# Patient Record
Sex: FEMALE | Employment: UNEMPLOYED | URBAN - METROPOLITAN AREA
[De-identification: names, ages, dates, MRNs, and addresses within clinical notes are randomized per-mention and may not be internally consistent; named-entity substitution may affect disease eponyms.]

---

## 2017-09-12 ENCOUNTER — OFFICE VISIT (OUTPATIENT)
Dept: PHYSICAL THERAPY | Age: 37
End: 2017-09-12
Attending: PHYSICAL MEDICINE & REHABILITATION
Payer: COMMERCIAL

## 2017-09-12 DIAGNOSIS — M79.10 MUSCLE PAIN: ICD-10-CM

## 2017-09-12 PROCEDURE — 97161 PT EVAL LOW COMPLEX 20 MIN: CPT | Performed by: PHYSICAL THERAPIST

## 2017-09-12 NOTE — PROGRESS NOTES
SPINE EVALUATION:   Referring Physician: Dr. Lizette Quintana  Diagnosis: Post-operative muscle pain  H/O Carolyn-Danlos Syndrome     Date of Service: 9/12/2017     PATIENT Marlee Osler is a 40year old y/o female who presents to therapy t listed above. Noralyn Remedies would benefit from skilled Physical Therapy to address the above impairments to increase strength and decrease pain.      Precautions:  None  OBJECTIVE:   Observation/Posture: forward head, rounded shoulders, abdominal atrophy  Gait: centralization and absence of radicular symptoms for 3 consecutive days to improve function with ADL (8 visits)  · Pt will have scapular strength of at least 4+/5 to improve posture with functional lifting tasks.  (8 visits)  · Pt will demonstrate improved

## 2017-09-19 ENCOUNTER — OFFICE VISIT (OUTPATIENT)
Dept: PHYSICAL THERAPY | Age: 37
End: 2017-09-19
Attending: PHYSICAL MEDICINE & REHABILITATION
Payer: COMMERCIAL

## 2017-09-19 PROCEDURE — 97112 NEUROMUSCULAR REEDUCATION: CPT | Performed by: PHYSICAL THERAPIST

## 2017-09-19 PROCEDURE — 97110 THERAPEUTIC EXERCISES: CPT | Performed by: PHYSICAL THERAPIST

## 2017-09-19 NOTE — PROGRESS NOTES
Dx:  Post-operative muscle pain  H/O Carolyn-Danlos Syndrome            Subjective: Mercy Alba returns stating she received her exercises via email. She is eager to begin her strengthening protocol at this time.      Objective:   HEP review: needs visual, tactile, 5 sec hold         RTB mid height shldr row x 15, standing extension x 15, high row x 15,          RTB ER, IR, scaption, and abduction x 15         Doorway pec stretch             Charges: Dilcia 2( 30 min) NR x 1  ( 15 min)   Total Timed Treatment: 45 min

## 2017-09-26 ENCOUNTER — OFFICE VISIT (OUTPATIENT)
Dept: PHYSICAL THERAPY | Age: 37
End: 2017-09-26
Attending: PHYSICAL MEDICINE & REHABILITATION
Payer: COMMERCIAL

## 2017-09-26 PROCEDURE — 97110 THERAPEUTIC EXERCISES: CPT | Performed by: PHYSICAL THERAPIST

## 2017-09-26 NOTE — PROGRESS NOTES
Dx:  Post-operative muscle pain  H/O Carolyn-Danlos Syndrome            Subjective: Nora Manuel states she has some mild shoulder and scapular pain. She has returned to performing her exercises. She has some difficulty with planks.  She feels she is progressing wel Plank 3x30 sec         Alt Quad bird dog x 60 sec 5 sec hold Alt Quad bird dog x 60 sec 5 sec hold        - Body weight squat 3x10        RTB mid height shldr row x 15, standing extension x 15, high row x 15,  Banded dead lift 3x10        RTB ER, IR, scapt

## 2017-10-03 ENCOUNTER — OFFICE VISIT (OUTPATIENT)
Dept: PHYSICAL THERAPY | Age: 37
End: 2017-10-03
Attending: PHYSICAL MEDICINE & REHABILITATION
Payer: COMMERCIAL

## 2017-10-03 PROCEDURE — 97112 NEUROMUSCULAR REEDUCATION: CPT | Performed by: PHYSICAL THERAPIST

## 2017-10-03 PROCEDURE — 97110 THERAPEUTIC EXERCISES: CPT | Performed by: PHYSICAL THERAPIST

## 2017-10-03 NOTE — PROGRESS NOTES
Dx:  Post-operative muscle pain  H/O Carolyn-Danlos Syndrome            Subjective: Moses Gamez states she has some mild shoulder and scapular pain. She has returned to performing her exercises. She has some difficulty with planks.  She feels she is progressing wel band walks 20 feet x 2 laps RTB Side band walks 20 feet x 2 laps RTB Side band walks 20 feet x 2 laps RTB       Monster walks x 2  Monster walks x 2 X 2 laps RTB       Bridge with clamshell x 20 RTB Bridge with clamshell x 20 RTB x20       Plank 3x30 sec

## 2017-10-10 ENCOUNTER — OFFICE VISIT (OUTPATIENT)
Dept: PHYSICAL THERAPY | Age: 37
End: 2017-10-10
Attending: PHYSICAL MEDICINE & REHABILITATION
Payer: COMMERCIAL

## 2017-10-10 PROCEDURE — 97110 THERAPEUTIC EXERCISES: CPT | Performed by: PHYSICAL THERAPIST

## 2017-10-10 NOTE — PROGRESS NOTES
Dx:  Post-operative muscle pain  H/O Carolyn-Danlos Syndrome            Subjective: Asia Quiros reports she is feeling stronger through her legs. She notes that she is performing HEP as instructed. Objective:   See chart.        Assessment: Klaudia luis progr walks 20 feet x 2 laps RTB Side band walks 20 feet x 2 laps RTB Side band walks 20 feet x 2 laps RTB Blue TB x 2 laps       Monster walks x 2  Monster walks x 2 X 2 laps RTB -      Bridge with clamshell x 20 RTB Bridge with clamshell x 20 RTB x20 Supine cr

## 2017-10-17 ENCOUNTER — APPOINTMENT (OUTPATIENT)
Dept: PHYSICAL THERAPY | Age: 37
End: 2017-10-17
Attending: PHYSICAL MEDICINE & REHABILITATION
Payer: COMMERCIAL

## 2017-10-17 ENCOUNTER — TELEPHONE (OUTPATIENT)
Dept: PHYSICAL THERAPY | Age: 37
End: 2017-10-17

## 2017-10-24 ENCOUNTER — OFFICE VISIT (OUTPATIENT)
Dept: PHYSICAL THERAPY | Age: 37
End: 2017-10-24
Attending: PHYSICAL MEDICINE & REHABILITATION
Payer: COMMERCIAL

## 2017-10-24 PROCEDURE — 97110 THERAPEUTIC EXERCISES: CPT | Performed by: PHYSICAL THERAPIST

## 2017-10-24 NOTE — PROGRESS NOTES
Progress  Summary    Pt has attended 6, cancelled 1, and no shown 0 visits in Physical Therapy.    Dx:  Post-operative muscle pain  H/O Carolyn-Danlos Syndrome       Subjective: Patient reports she continues to have difficulty with getting up from bed as we 5/5  Knee Flexion: R 5/5; L 5/5   Knee extension: R 5/5; L 5/5          Special tests:   ULNNT: + bilaterally for median Improved  Core strength (DL lowering): pain at 10 deg from 90 deg (remains)  Sit-up test: pain with initiation of sit up Improved    Go and for this course of care. Thank you for your referral. If you have any questions, please contact me at Dept: [phone].     Sincerely,  Electronically signed by therapist: Ho White PT        Date: 9/19/2017  Tx#: 2/ Date: 9/26  Tx#: 3/ Da Treatment Time: 30 min

## 2017-10-31 ENCOUNTER — APPOINTMENT (OUTPATIENT)
Dept: PHYSICAL THERAPY | Age: 37
End: 2017-10-31
Attending: PHYSICAL MEDICINE & REHABILITATION
Payer: COMMERCIAL

## 2017-11-14 ENCOUNTER — OFFICE VISIT (OUTPATIENT)
Dept: PHYSICAL THERAPY | Age: 37
End: 2017-11-14
Attending: PHYSICAL MEDICINE & REHABILITATION
Payer: COMMERCIAL

## 2017-11-14 PROCEDURE — 97140 MANUAL THERAPY 1/> REGIONS: CPT | Performed by: PHYSICAL THERAPIST

## 2017-11-14 PROCEDURE — 97110 THERAPEUTIC EXERCISES: CPT | Performed by: PHYSICAL THERAPIST

## 2017-11-14 NOTE — PROGRESS NOTES
Dx:  Post-operative muscle pain  H/O Carolyn-Danlos Syndrome       Subjective: Patient returns following 2 week hiatus due to child illness. She states she is feeling stronger through her core.  She continues to have some mild shoulder and neck discomfort 11/14  Tx#: 7/8 Date:    Tx#: 8/   HEP review UBE UE&LE x 5 min L5 Treadmill x 5 min Treadmill for x 2 min  Lateral x 2 min each Treadmill for x 2 min  Lateral x 2 min each Treadmill for x 2 min  Lateral x 2 min each    3 way hip kick x10 each RTB X 15 RTB stretch 2x30 sec         Charges: Dilcia 2, M x 1 Total Timed Treatment: 45 min     Total Treatment Time: 45 min

## 2017-12-12 ENCOUNTER — OFFICE VISIT (OUTPATIENT)
Dept: PHYSICAL THERAPY | Age: 37
End: 2017-12-12
Attending: PHYSICAL MEDICINE & REHABILITATION
Payer: COMMERCIAL

## 2017-12-12 PROCEDURE — 97140 MANUAL THERAPY 1/> REGIONS: CPT | Performed by: PHYSICAL THERAPIST

## 2017-12-12 PROCEDURE — 97110 THERAPEUTIC EXERCISES: CPT | Performed by: PHYSICAL THERAPIST

## 2017-12-12 NOTE — PROGRESS NOTES
Discharge Summary    Pt has attended 8, cancelled 0, and no shown 0 visits in Physical Therapy.    Dx:  Post-operative muscle pain  H/O Carolyn-Danlos Syndrome    Subjective: Patient reports she has not been as compliant with her exercises as instructed per pain. ( 8 visits ) - not met  · Pt will report improved symptom centralization and absence of radicular symptoms for 3 consecutive days to improve function with ADL (8 visits) - met  · Pt will have scapular strength of at least 4+/5 to improve posture with Supine crunch 2x10 UT stretch 3x30 sec  ITband mobilization x 4 min Psoas mobilization x 4 min   Plank 3x30 sec  Plank 3x30 sec  2x45 sec  Modified plank with SB pertubation 2x10 Plank 2x30 sec  3x15 sec with iso contract Plank 60 sec SA   Alt Quad bird do

## 2018-01-08 ENCOUNTER — APPOINTMENT (OUTPATIENT)
Dept: PHYSICAL THERAPY | Age: 38
End: 2018-01-08
Attending: PHYSICAL MEDICINE & REHABILITATION
Payer: COMMERCIAL

## 2018-01-15 ENCOUNTER — APPOINTMENT (OUTPATIENT)
Dept: PHYSICAL THERAPY | Age: 38
End: 2018-01-15
Attending: PHYSICAL MEDICINE & REHABILITATION
Payer: COMMERCIAL

## 2018-01-22 ENCOUNTER — APPOINTMENT (OUTPATIENT)
Dept: PHYSICAL THERAPY | Age: 38
End: 2018-01-22
Attending: PHYSICAL MEDICINE & REHABILITATION
Payer: COMMERCIAL

## 2018-01-29 ENCOUNTER — APPOINTMENT (OUTPATIENT)
Dept: PHYSICAL THERAPY | Age: 38
End: 2018-01-29
Attending: PHYSICAL MEDICINE & REHABILITATION
Payer: COMMERCIAL

## 2018-11-28 ENCOUNTER — IMAGING SERVICES (OUTPATIENT)
Dept: OTHER | Age: 38
End: 2018-11-28

## 2018-11-28 ENCOUNTER — HOSPITAL (OUTPATIENT)
Dept: OTHER | Age: 38
End: 2018-11-28
Attending: INTERNAL MEDICINE

## 2019-09-10 ENCOUNTER — HOSPITAL (OUTPATIENT)
Dept: OTHER | Age: 39
End: 2019-09-10
Attending: EMERGENCY MEDICINE

## 2019-09-10 ENCOUNTER — APPOINTMENT (OUTPATIENT)
Dept: URGENT CARE | Age: 39
End: 2019-09-10

## 2022-01-01 ENCOUNTER — EXTERNAL RECORD (OUTPATIENT)
Dept: OTHER | Age: 42
End: 2022-01-01

## 2022-04-11 LAB
25(OH)D3+25(OH)D2 SERPL-MCNC: 36 NG/ML (ref 30–100)
ALBUMIN SERPL-MCNC: 4.3 G/DL (ref 3.6–5.1)
ALBUMIN/GLOB SERPL: 1.8 (CALC) (ref 1–2.5)
ALP SERPL-CCNC: 72 U/L (ref 31–125)
ALT SERPL-CCNC: 25 U/L (ref 6–29)
AST SERPL-CCNC: 23 U/L (ref 10–30)
BASOPHILS # BLD: 68 CELLS/UL (ref 0–200)
BASOPHILS NFR BLD: 0.8 %
BILIRUB SERPL-MCNC: 0.4 MG/DL (ref 0.2–1.2)
BUN SERPL-MCNC: 9 MG/DL (ref 7–25)
BUN/CREAT SERPL: NORMAL (CALC) (ref 6–22)
C TRACH RRNA SPEC QL NAA+PROBE: NOT DETECTED
C TRACH RRNA SPEC QL NAA+PROBE: NOT DETECTED
CALCIUM SERPL-MCNC: 9.7 MG/DL (ref 8.6–10.2)
CALCIUM, CORRECTED: 9.8 MG/DL (CALC) (ref 8.6–10.2)
CHLORIDE SERPL-SCNC: 104 MMOL/L (ref 98–110)
CHOLEST SERPL-MCNC: 224 MG/DL
CHOLEST/HDLC SERPL: 3.9 (CALC)
CO2 SERPL-SCNC: 27 MMOL/L (ref 20–32)
CREAT SERPL-MCNC: 0.81 MG/DL (ref 0.5–1.1)
CRP SERPL-MCNC: 1.6 MG/L
EOSINOPHIL # BLD: 119 CELLS/UL (ref 15–500)
EOSINOPHIL NFR BLD: 1.4 %
ERYTHROCYTE [DISTWIDTH] IN BLOOD: 14.8 % (ref 11–15)
ERYTHROCYTE [SEDIMENTATION RATE] IN BLOOD BY WESTERGREN METHOD: 2 MM/H
GLOBULIN SER-MCNC: 2.4 G/DL (CALC) (ref 1.9–3.7)
GLUCOSE SERPL-MCNC: 96 MG/DL (ref 65–99)
HCT VFR BLD CALC: 41.3 % (ref 35–45)
HDLC SERPL-MCNC: 57 MG/DL
HGB BLD-MCNC: 13.3 G/DL (ref 11.7–15.5)
HIV 1+2 AB+HIV1 P24 AG SERPL QL IA: NORMAL
LDLC SERPL CALC-MCNC: 135 MG/DL (CALC)
LENGTH OF FAST TIME PATIENT: ABNORMAL H
LENGTH OF FAST TIME PATIENT: NORMAL H
LYMPHOCYTES # BLD: 2023 CELLS/UL (ref 850–3900)
LYMPHOCYTES NFR BLD: 23.8 %
MCH RBC QN AUTO: 27.7 PG (ref 27–33)
MCHC RBC AUTO-ENTMCNC: 32.2 G/DL (ref 32–36)
MCV RBC AUTO: 86 FL (ref 80–100)
MONOCYTES # BLD: 561 CELLS/UL (ref 200–950)
MONOCYTES NFR BLD: 6.6 %
MPV (OFFPRE2): 10.8 FL (ref 7.5–12.5)
N GONORRHOEA RRNA SPEC QL NAA+PROBE: NOT DETECTED
N GONORRHOEA RRNA SPEC QL NAA+PROBE: NOT DETECTED
NEUTROPHILS # BLD: 5729 CELLS/UL (ref 1500–7800)
NEUTROPHILS NFR BLD: 67.4 %
NONHDLC SERPL-MCNC: 167 MG/DL (CALC)
PLATELET # BLD: 276 THOUSAND/UL (ref 140–400)
POTASSIUM SERPL-SCNC: 4.6 MMOL/L (ref 3.5–5.3)
PROT SERPL-MCNC: 6.7 G/DL (ref 6.1–8.1)
RBC # BLD: 4.8 MILLION/UL (ref 3.8–5.1)
SODIUM SERPL-SCNC: 137 MMOL/L (ref 135–146)
SPECIMEN SOURCE: NORMAL
SPECIMEN SOURCE: NORMAL
T3 SERPL-MCNC: 111 NG/DL (ref 76–181)
T4 SERPL-MCNC: 6.9 MCG/DL (ref 5.1–11.9)
TREPONEMA PALLIDUM IGG/IGM AB (SYPGM): NEGATIVE
TRIGL SERPL-MCNC: 184 MG/DL
TSH SERPL-ACNC: 0.67 MIU/L
WBC # BLD: 8.5 THOUSAND/UL (ref 3.8–10.8)

## 2022-05-07 LAB
APPEARANCE UR: CLEAR
BACTERIA #/AREA URNS HPF: ABNORMAL /HPF
BILIRUB UR QL: NEGATIVE
COLOR UR: YELLOW
GLUCOSE UR-MCNC: NEGATIVE MG/DL
HGB UR QL: ABNORMAL
HYALINE CASTS #/AREA URNS LPF: ABNORMAL /LPF
KETONES UR-MCNC: NEGATIVE MG/DL
LEUKOCYTE ESTERASE UR QL STRIP: ABNORMAL
NITRITE UR QL: NEGATIVE
PH UR: 5.5 [PH] (ref 5–8)
PROT UR QL: NEGATIVE
RBC #/AREA URNS HPF: ABNORMAL /HPF
SP GR UR: 1.02 (ref 1–1.03)
SQUAMOUS #/AREA URNS HPF: ABNORMAL /HPF
WBC #/AREA URNS HPF: ABNORMAL /HPF

## 2022-07-21 ENCOUNTER — EXTERNAL RECORD (OUTPATIENT)
Dept: HEALTH INFORMATION MANAGEMENT | Facility: OTHER | Age: 42
End: 2022-07-21

## 2022-07-22 ENCOUNTER — TELEPHONE (OUTPATIENT)
Dept: SCHEDULING | Age: 42
End: 2022-07-22

## 2022-07-28 ENCOUNTER — TELEPHONE (OUTPATIENT)
Dept: RHEUMATOLOGY | Age: 42
End: 2022-07-28

## 2022-08-01 ENCOUNTER — APPOINTMENT (OUTPATIENT)
Dept: RHEUMATOLOGY | Age: 42
End: 2022-08-01

## 2022-09-22 ENCOUNTER — OFFICE VISIT (OUTPATIENT)
Dept: RHEUMATOLOGY | Age: 42
End: 2022-09-22

## 2022-09-22 VITALS
BODY MASS INDEX: 32.81 KG/M2 | DIASTOLIC BLOOD PRESSURE: 75 MMHG | OXYGEN SATURATION: 98 % | HEIGHT: 64 IN | WEIGHT: 192.2 LBS | TEMPERATURE: 97.8 F | SYSTOLIC BLOOD PRESSURE: 119 MMHG | HEART RATE: 81 BPM

## 2022-09-22 DIAGNOSIS — L29.9 PRURITUS: Primary | ICD-10-CM

## 2022-09-22 DIAGNOSIS — Q79.60 EHLERS-DANLOS DISEASE: ICD-10-CM

## 2022-09-22 DIAGNOSIS — M54.2 NECK PAIN, MUSCULOSKELETAL: ICD-10-CM

## 2022-09-22 PROCEDURE — 99204 OFFICE O/P NEW MOD 45 MIN: CPT | Performed by: INTERNAL MEDICINE

## 2022-09-22 RX ORDER — IBUPROFEN 200 MG
TABLET ORAL
COMMUNITY

## 2022-09-22 RX ORDER — MELOXICAM 7.5 MG/1
TABLET ORAL
COMMUNITY
Start: 2022-08-29

## 2022-09-22 RX ORDER — EMTRICITABINE AND TENOFOVIR DISOPROXIL FUMARATE 200; 300 MG/1; MG/1
TABLET, FILM COATED ORAL
COMMUNITY
Start: 2022-06-01

## 2022-09-22 RX ORDER — LAMOTRIGINE 25 MG/1
TABLET ORAL
COMMUNITY
Start: 2022-07-20

## 2022-09-22 RX ORDER — ONDANSETRON 4 MG/1
TABLET, ORALLY DISINTEGRATING ORAL
COMMUNITY

## 2022-09-22 RX ORDER — DESVENLAFAXINE 25 MG/1
TABLET, EXTENDED RELEASE ORAL
COMMUNITY
Start: 2022-07-19

## 2022-09-22 RX ORDER — LORATADINE 10 MG/1
10 TABLET ORAL DAILY
COMMUNITY
Start: 2022-09-15

## 2022-09-22 RX ORDER — ALBUTEROL SULFATE 90 UG/1
AEROSOL, METERED RESPIRATORY (INHALATION)
COMMUNITY
Start: 2022-08-29

## 2022-09-22 RX ORDER — EPINEPHRINE 0.3 MG/.3ML
INJECTION SUBCUTANEOUS
COMMUNITY
Start: 2022-08-29

## 2022-09-22 RX ORDER — CYCLOBENZAPRINE HCL 5 MG
5 TABLET ORAL 2 TIMES DAILY PRN
Qty: 14 TABLET | Refills: 0 | Status: SHIPPED | OUTPATIENT
Start: 2022-09-22 | End: 2023-01-30 | Stop reason: SDUPTHER

## 2022-09-22 RX ORDER — METRONIDAZOLE 7.5 MG/G
GEL TOPICAL
COMMUNITY

## 2022-09-22 RX ORDER — NALTREXONE 100 %
1.5 POWDER (GRAM) MISCELLANEOUS
COMMUNITY

## 2022-09-22 ASSESSMENT — ENCOUNTER SYMPTOMS
AGITATION: 0
EYE PAIN: 0
FEVER: 0
EYE REDNESS: 0
ADENOPATHY: 0
SHORTNESS OF BREATH: 0
BLOOD IN STOOL: 0
NUMBNESS: 0

## 2022-10-25 ENCOUNTER — HOSPITAL ENCOUNTER (OUTPATIENT)
Dept: NEUROLOGY | Age: 42
Discharge: HOME | End: 2022-10-25
Attending: INTERNAL MEDICINE

## 2022-10-25 DIAGNOSIS — M54.2 NECK PAIN, MUSCULOSKELETAL: ICD-10-CM

## 2022-10-25 PROCEDURE — 95911 NRV CNDJ TEST 9-10 STUDIES: CPT

## 2022-10-25 PROCEDURE — 95911 NRV CNDJ TEST 9-10 STUDIES: CPT | Performed by: PSYCHIATRY & NEUROLOGY

## 2022-10-25 PROCEDURE — 95886 MUSC TEST DONE W/N TEST COMP: CPT

## 2022-10-25 PROCEDURE — 95886 MUSC TEST DONE W/N TEST COMP: CPT | Performed by: PSYCHIATRY & NEUROLOGY

## 2023-01-20 ENCOUNTER — APPOINTMENT (OUTPATIENT)
Dept: RHEUMATOLOGY | Age: 43
End: 2023-01-20

## 2023-01-30 ENCOUNTER — OFFICE VISIT (OUTPATIENT)
Dept: RHEUMATOLOGY | Age: 43
End: 2023-01-30

## 2023-01-30 VITALS
SYSTOLIC BLOOD PRESSURE: 121 MMHG | HEIGHT: 64 IN | TEMPERATURE: 96.2 F | HEART RATE: 75 BPM | WEIGHT: 186 LBS | BODY MASS INDEX: 31.76 KG/M2 | DIASTOLIC BLOOD PRESSURE: 77 MMHG | OXYGEN SATURATION: 100 %

## 2023-01-30 DIAGNOSIS — G56.03 CARPAL TUNNEL SYNDROME, BILATERAL: Primary | ICD-10-CM

## 2023-01-30 DIAGNOSIS — Q79.60 EHLERS-DANLOS DISEASE: ICD-10-CM

## 2023-01-30 DIAGNOSIS — M54.2 NECK PAIN, MUSCULOSKELETAL: ICD-10-CM

## 2023-01-30 DIAGNOSIS — M54.12 CERVICAL RADICULOPATHY: ICD-10-CM

## 2023-01-30 PROCEDURE — 99214 OFFICE O/P EST MOD 30 MIN: CPT | Performed by: INTERNAL MEDICINE

## 2023-01-30 RX ORDER — CYCLOBENZAPRINE HCL 5 MG
5 TABLET ORAL 2 TIMES DAILY PRN
Qty: 45 TABLET | Refills: 1 | Status: SHIPPED | OUTPATIENT
Start: 2023-01-30 | End: 2023-09-27

## 2023-01-30 ASSESSMENT — ENCOUNTER SYMPTOMS
SHORTNESS OF BREATH: 0
BLOOD IN STOOL: 0
FEVER: 0
EYE PAIN: 0
NUMBNESS: 0
EYE REDNESS: 0
AGITATION: 0
ADENOPATHY: 0

## 2023-02-28 ENCOUNTER — APPOINTMENT (OUTPATIENT)
Dept: REHABILITATION | Age: 43
End: 2023-02-28
Attending: INTERNAL MEDICINE

## 2023-03-10 ENCOUNTER — HOSPITAL ENCOUNTER (OUTPATIENT)
Dept: REHABILITATION | Age: 43
Discharge: STILL ADMITTED OR ADMINISTRATIVE | End: 2023-03-10
Attending: INTERNAL MEDICINE

## 2023-03-10 PROCEDURE — 97162 PT EVAL MOD COMPLEX 30 MIN: CPT

## 2023-03-10 PROCEDURE — 97530 THERAPEUTIC ACTIVITIES: CPT

## 2023-03-10 PROCEDURE — 97110 THERAPEUTIC EXERCISES: CPT

## 2023-03-16 ASSESSMENT — ENCOUNTER SYMPTOMS: ALLEVIATING FACTORS: AVOIDING MOVEMENT IN INVOLVED AREA

## 2023-04-28 ENCOUNTER — HOSPITAL ENCOUNTER (OUTPATIENT)
Dept: REHABILITATION | Age: 43
Discharge: STILL ADMITTED OR ADMINISTRATIVE | End: 2023-04-28
Attending: INTERNAL MEDICINE

## 2023-04-28 PROCEDURE — 97110 THERAPEUTIC EXERCISES: CPT

## 2023-05-10 ENCOUNTER — HOSPITAL ENCOUNTER (OUTPATIENT)
Dept: REHABILITATION | Age: 43
Discharge: STILL ADMITTED OR ADMINISTRATIVE | End: 2023-05-10
Attending: INTERNAL MEDICINE

## 2023-05-10 PROCEDURE — 97110 THERAPEUTIC EXERCISES: CPT

## 2023-05-24 ENCOUNTER — APPOINTMENT (OUTPATIENT)
Dept: REHABILITATION | Age: 43
End: 2023-05-24
Attending: INTERNAL MEDICINE

## 2023-05-30 ENCOUNTER — APPOINTMENT (OUTPATIENT)
Dept: REHABILITATION | Age: 43
End: 2023-05-30

## 2023-06-13 ENCOUNTER — APPOINTMENT (OUTPATIENT)
Dept: REHABILITATION | Age: 43
End: 2023-06-13

## 2023-06-20 ENCOUNTER — APPOINTMENT (OUTPATIENT)
Dept: REHABILITATION | Age: 43
End: 2023-06-20

## 2023-09-27 DIAGNOSIS — M54.2 NECK PAIN, MUSCULOSKELETAL: ICD-10-CM

## 2023-09-27 RX ORDER — CYCLOBENZAPRINE HCL 5 MG
5 TABLET ORAL 2 TIMES DAILY PRN
Qty: 45 TABLET | Refills: 1 | Status: SHIPPED | OUTPATIENT
Start: 2023-09-27

## 2024-12-12 ENCOUNTER — TELEPHONE (OUTPATIENT)
Dept: NEUROSURGERY | Age: 44
End: 2024-12-12

## 2025-01-22 ENCOUNTER — APPOINTMENT (OUTPATIENT)
Dept: GENERAL RADIOLOGY | Age: 45
End: 2025-01-22

## 2025-01-22 ENCOUNTER — APPOINTMENT (OUTPATIENT)
Dept: NEUROSURGERY | Age: 45
End: 2025-01-22

## 2025-01-22 VITALS
BODY MASS INDEX: 33.12 KG/M2 | DIASTOLIC BLOOD PRESSURE: 92 MMHG | RESPIRATION RATE: 16 BRPM | SYSTOLIC BLOOD PRESSURE: 132 MMHG | HEART RATE: 96 BPM | WEIGHT: 194 LBS | HEIGHT: 64 IN

## 2025-01-22 DIAGNOSIS — M54.2 NECK PAIN: Primary | ICD-10-CM

## 2025-01-22 DIAGNOSIS — G89.29 CHRONIC RIGHT-SIDED LOW BACK PAIN WITH RIGHT-SIDED SCIATICA: ICD-10-CM

## 2025-01-22 DIAGNOSIS — M54.41 CHRONIC RIGHT-SIDED LOW BACK PAIN WITH RIGHT-SIDED SCIATICA: ICD-10-CM

## 2025-01-22 DIAGNOSIS — M54.12 CERVICAL RADICULOPATHY: ICD-10-CM

## 2025-01-22 PROCEDURE — 72050 X-RAY EXAM NECK SPINE 4/5VWS: CPT | Performed by: PHYSICIAN ASSISTANT

## 2025-01-22 RX ORDER — FAMOTIDINE 10 MG
10 TABLET ORAL 2 TIMES DAILY
COMMUNITY

## 2025-01-22 RX ORDER — MONTELUKAST SODIUM 10 MG/1
10 TABLET ORAL NIGHTLY
COMMUNITY

## 2025-01-22 RX ORDER — ESTRADIOL 0.05 MG/D
1 PATCH, EXTENDED RELEASE TRANSDERMAL
COMMUNITY

## 2025-01-22 RX ORDER — CYCLOBENZAPRINE HCL 10 MG
10 TABLET ORAL 3 TIMES DAILY PRN
COMMUNITY

## 2025-01-22 RX ORDER — PROGESTERONE 100 MG/1
1 CAPSULE ORAL DAILY
COMMUNITY

## 2025-01-27 ENCOUNTER — APPOINTMENT (OUTPATIENT)
Dept: NEUROSURGERY | Age: 45
End: 2025-01-27

## 2025-01-30 ENCOUNTER — APPOINTMENT (OUTPATIENT)
Dept: NEUROSURGERY | Age: 45
End: 2025-01-30

## (undated) NOTE — LETTER
Patient Name: Loura Brittle  YOB: 1980          MRN number:  [id number]  Date:  9/13/2017  Referring Physician: Dr. Sarah Meeks EVALUATION:    Referring Physician: Dr. Mack Post  Diagnosis: Post-operative muscle pain  H/O Eh decreased scapular strength, decreased abdominal strength, hypermobility, and decreased shoulder strength. These impairments are leading to functional limitations including; listed above.      Lester Ta would benefit from skilled Physical Therapy to address the mechanics to decrease pain and improve spinal safety (8 visits)  · Pt will improve core strength to 5/5 to increase function and decreased pain.  ( 8 visits )  · Pt will report improved symptom centralization and absence of radicular symptoms for 3 consecut

## (undated) NOTE — LETTER
Patient Name: Inna Bailey  YOB: 1980          MRN number:  [id number]  Date:  10/25/2017  Referring Physician:  Angela Jean     Progress  Summary    Pt has attended 6, cancelled 1, and no shown 0 visits in Physical Therapy.    Dx: Low trap: R 4/5; L 4/5   Hip flexion: R 4+/5; L 4+/5  Hip abduction: R 4+/5; L 4+/5  Hip Extension: R 4/5; L 4/5   Hip ER: R 4+/5; L 4+/5  Hip IR: R 5/5; L 5/5  Knee Flexion: R 5/5; L 5/5   Knee extension: R 5/5; L 5/5          Special tests:   ULNNT: + bi instruction and progression       Patient/Family/Caregiver was advised of these findings, precautions, and treatment options and has agreed to actively participate in planning and for this course of care.     Thank you for your referral. If you have any que